# Patient Record
Sex: FEMALE | Race: WHITE | NOT HISPANIC OR LATINO | Employment: FULL TIME | URBAN - METROPOLITAN AREA
[De-identification: names, ages, dates, MRNs, and addresses within clinical notes are randomized per-mention and may not be internally consistent; named-entity substitution may affect disease eponyms.]

---

## 2019-10-30 ENCOUNTER — TRANSCRIBE ORDERS (OUTPATIENT)
Dept: ADMINISTRATIVE | Facility: HOSPITAL | Age: 34
End: 2019-10-30

## 2019-10-30 DIAGNOSIS — N18.30 CHRONIC KIDNEY DISEASE, STAGE III (MODERATE) (HCC): Primary | ICD-10-CM

## 2019-11-07 ENCOUNTER — HOSPITAL ENCOUNTER (OUTPATIENT)
Dept: CT IMAGING | Facility: HOSPITAL | Age: 34
Discharge: HOME | End: 2019-11-08
Attending: INTERNAL MEDICINE | Admitting: INTERNAL MEDICINE

## 2019-11-07 DIAGNOSIS — N18.30 CHRONIC KIDNEY DISEASE, STAGE III (MODERATE) (HCC): ICD-10-CM

## 2019-11-07 LAB
ABO GROUP BLD: NORMAL
ALBUMIN SERPL-MCNC: 4.1 G/DL (ref 3.5–5.2)
ANION GAP SERPL CALCULATED.3IONS-SCNC: 10 MMOL/L (ref 5–15)
APTT PPP: 27.6 SECONDS (ref 24–37)
B-HCG UR QL: NEGATIVE
BACTERIA UR QL AUTO: NORMAL /HPF
BILIRUB UR QL STRIP: NEGATIVE
BLD GP AB SCN SERPL QL: NEGATIVE
BUN BLD-MCNC: 16 MG/DL (ref 6–20)
BUN/CREAT SERPL: 11.5 (ref 7–25)
CALCIUM SPEC-SCNC: 9.5 MG/DL (ref 8.6–10.5)
CHLORIDE SERPL-SCNC: 105 MMOL/L (ref 98–107)
CLARITY UR: CLEAR
CLOSURE TME COLL+ADP BLD: 98 SECONDS (ref 54–123)
CLOSURE TME COLL+EPINEP BLD: 146 SECONDS (ref 72–192)
CO2 SERPL-SCNC: 24 MMOL/L (ref 22–29)
COLOR UR: YELLOW
CREAT BLD-MCNC: 1.39 MG/DL (ref 0.57–1)
DEPRECATED RDW RBC AUTO: 39.8 FL (ref 37–54)
ERYTHROCYTE [DISTWIDTH] IN BLOOD BY AUTOMATED COUNT: 13 % (ref 12.3–15.4)
GFR SERPL CREATININE-BSD FRML MDRD: 43 ML/MIN/1.73
GLUCOSE BLD-MCNC: 90 MG/DL (ref 65–99)
GLUCOSE UR STRIP-MCNC: NEGATIVE MG/DL
HCT VFR BLD AUTO: 37.3 % (ref 34–46.6)
HCT VFR BLD AUTO: 42.6 % (ref 34–46.6)
HGB BLD-MCNC: 11.8 G/DL (ref 12–15.9)
HGB BLD-MCNC: 13.7 G/DL (ref 12–15.9)
HGB UR QL STRIP.AUTO: ABNORMAL
HYALINE CASTS UR QL AUTO: NORMAL /LPF
INR PPP: 1.03 (ref 0.85–1.16)
KETONES UR QL STRIP: NEGATIVE
LEUKOCYTE ESTERASE UR QL STRIP.AUTO: NEGATIVE
MCH RBC QN AUTO: 27.4 PG (ref 26.6–33)
MCHC RBC AUTO-ENTMCNC: 32.2 G/DL (ref 31.5–35.7)
MCV RBC AUTO: 85.2 FL (ref 79–97)
NITRITE UR QL STRIP: NEGATIVE
PH UR STRIP.AUTO: 7 [PH] (ref 5–8)
PHOSPHATE SERPL-MCNC: 3.3 MG/DL (ref 2.5–4.5)
PLATELET # BLD AUTO: 215 10*3/MM3 (ref 140–450)
PMV BLD AUTO: 12.7 FL (ref 6–12)
POTASSIUM BLD-SCNC: 4.3 MMOL/L (ref 3.5–5.2)
PROT UR QL STRIP: ABNORMAL
PROTHROMBIN TIME: 13 SECONDS (ref 11.2–14.3)
RBC # BLD AUTO: 5 10*6/MM3 (ref 3.77–5.28)
RBC # UR: NORMAL /HPF
REF LAB TEST METHOD: NORMAL
RH BLD: POSITIVE
SODIUM BLD-SCNC: 139 MMOL/L (ref 136–145)
SP GR UR STRIP: 1.01 (ref 1–1.03)
SQUAMOUS #/AREA URNS HPF: NORMAL /HPF
T&S EXPIRATION DATE: NORMAL
UROBILINOGEN UR QL STRIP: ABNORMAL
WBC NRBC COR # BLD: 4.38 10*3/MM3 (ref 3.4–10.8)
WBC UR QL AUTO: NORMAL /HPF

## 2019-11-07 PROCEDURE — 88348 ELECTRON MICROSCOPY DX: CPT | Performed by: INTERNAL MEDICINE

## 2019-11-07 PROCEDURE — 80069 RENAL FUNCTION PANEL: CPT | Performed by: INTERNAL MEDICINE

## 2019-11-07 PROCEDURE — 86900 BLOOD TYPING SEROLOGIC ABO: CPT | Performed by: INTERNAL MEDICINE

## 2019-11-07 PROCEDURE — 85018 HEMOGLOBIN: CPT | Performed by: INTERNAL MEDICINE

## 2019-11-07 PROCEDURE — 86850 RBC ANTIBODY SCREEN: CPT | Performed by: INTERNAL MEDICINE

## 2019-11-07 PROCEDURE — 85730 THROMBOPLASTIN TIME PARTIAL: CPT | Performed by: INTERNAL MEDICINE

## 2019-11-07 PROCEDURE — 25010000002 ATROPINE PER 0.01 MG: Performed by: INTERNAL MEDICINE

## 2019-11-07 PROCEDURE — 85014 HEMATOCRIT: CPT | Performed by: INTERNAL MEDICINE

## 2019-11-07 PROCEDURE — 25010000003 LIDOCAINE 1 % SOLUTION: Performed by: INTERNAL MEDICINE

## 2019-11-07 PROCEDURE — 88346 IMFLUOR 1ST 1ANTB STAIN PX: CPT | Performed by: INTERNAL MEDICINE

## 2019-11-07 PROCEDURE — 88350 IMFLUOR EA ADDL 1ANTB STN PX: CPT | Performed by: INTERNAL MEDICINE

## 2019-11-07 PROCEDURE — 86901 BLOOD TYPING SEROLOGIC RH(D): CPT | Performed by: INTERNAL MEDICINE

## 2019-11-07 PROCEDURE — 85027 COMPLETE CBC AUTOMATED: CPT | Performed by: INTERNAL MEDICINE

## 2019-11-07 PROCEDURE — 88313 SPECIAL STAINS GROUP 2: CPT | Performed by: INTERNAL MEDICINE

## 2019-11-07 PROCEDURE — G0378 HOSPITAL OBSERVATION PER HR: HCPCS

## 2019-11-07 PROCEDURE — 88305 TISSUE EXAM BY PATHOLOGIST: CPT | Performed by: INTERNAL MEDICINE

## 2019-11-07 PROCEDURE — 81001 URINALYSIS AUTO W/SCOPE: CPT | Performed by: INTERNAL MEDICINE

## 2019-11-07 PROCEDURE — 77012 CT SCAN FOR NEEDLE BIOPSY: CPT

## 2019-11-07 PROCEDURE — 25010000002 PROMETHAZINE PER 50 MG: Performed by: INTERNAL MEDICINE

## 2019-11-07 PROCEDURE — 85576 BLOOD PLATELET AGGREGATION: CPT | Performed by: INTERNAL MEDICINE

## 2019-11-07 PROCEDURE — 85610 PROTHROMBIN TIME: CPT | Performed by: INTERNAL MEDICINE

## 2019-11-07 PROCEDURE — 81025 URINE PREGNANCY TEST: CPT | Performed by: INTERNAL MEDICINE

## 2019-11-07 RX ORDER — LIDOCAINE HYDROCHLORIDE 10 MG/ML
20 INJECTION, SOLUTION INFILTRATION; PERINEURAL ONCE
Status: COMPLETED | OUTPATIENT
Start: 2019-11-07 | End: 2019-11-07

## 2019-11-07 RX ORDER — LOSARTAN POTASSIUM AND HYDROCHLOROTHIAZIDE 12.5; 5 MG/1; MG/1
1 TABLET ORAL DAILY
COMMUNITY
End: 2020-01-08

## 2019-11-07 RX ORDER — MEPERIDINE HYDROCHLORIDE 50 MG/ML
25 INJECTION INTRAMUSCULAR; INTRAVENOUS; SUBCUTANEOUS
Status: COMPLETED | OUTPATIENT
Start: 2019-11-07 | End: 2019-11-07

## 2019-11-07 RX ORDER — LORAZEPAM 2 MG/ML
0.5 INJECTION INTRAMUSCULAR ONCE AS NEEDED
Status: DISCONTINUED | OUTPATIENT
Start: 2019-11-07 | End: 2019-11-08 | Stop reason: HOSPADM

## 2019-11-07 RX ORDER — PROMETHAZINE HYDROCHLORIDE 25 MG/ML
12.5 INJECTION, SOLUTION INTRAMUSCULAR; INTRAVENOUS
Status: COMPLETED | OUTPATIENT
Start: 2019-11-07 | End: 2019-11-07

## 2019-11-07 RX ORDER — ATROPINE SULFATE 0.4 MG/ML
0.4 AMPUL (ML) INJECTION
Status: COMPLETED | OUTPATIENT
Start: 2019-11-07 | End: 2019-11-07

## 2019-11-07 RX ADMIN — LIDOCAINE HYDROCHLORIDE 20 ML: 10 INJECTION, SOLUTION INFILTRATION; PERINEURAL at 13:55

## 2019-11-07 RX ADMIN — ATROPINE SULFATE 0.4 MG: 0.4 INJECTION, SOLUTION INTRAMUSCULAR; INTRAVENOUS; SUBCUTANEOUS at 13:09

## 2019-11-07 RX ADMIN — PROMETHAZINE HYDROCHLORIDE 12.5 MG: 25 INJECTION INTRAMUSCULAR; INTRAVENOUS at 13:09

## 2019-11-07 RX ADMIN — MEPERIDINE HYDROCHLORIDE 25 MG: 50 INJECTION INTRAMUSCULAR; INTRAVENOUS; SUBCUTANEOUS at 13:06

## 2019-11-08 VITALS
RESPIRATION RATE: 16 BRPM | HEART RATE: 64 BPM | WEIGHT: 169.09 LBS | SYSTOLIC BLOOD PRESSURE: 117 MMHG | TEMPERATURE: 98 F | DIASTOLIC BLOOD PRESSURE: 67 MMHG | OXYGEN SATURATION: 94 % | BODY MASS INDEX: 28.17 KG/M2 | HEIGHT: 65 IN

## 2019-11-08 LAB
DEPRECATED RDW RBC AUTO: 40.5 FL (ref 37–54)
ERYTHROCYTE [DISTWIDTH] IN BLOOD BY AUTOMATED COUNT: 13 % (ref 12.3–15.4)
HCT VFR BLD AUTO: 37.2 % (ref 34–46.6)
HGB BLD-MCNC: 11.9 G/DL (ref 12–15.9)
MCH RBC QN AUTO: 27.4 PG (ref 26.6–33)
MCHC RBC AUTO-ENTMCNC: 32 G/DL (ref 31.5–35.7)
MCV RBC AUTO: 85.7 FL (ref 79–97)
PLATELET # BLD AUTO: 191 10*3/MM3 (ref 140–450)
PMV BLD AUTO: 12.4 FL (ref 6–12)
RBC # BLD AUTO: 4.34 10*6/MM3 (ref 3.77–5.28)
WBC NRBC COR # BLD: 5.67 10*3/MM3 (ref 3.4–10.8)

## 2019-11-08 PROCEDURE — 85027 COMPLETE CBC AUTOMATED: CPT | Performed by: INTERNAL MEDICINE

## 2019-11-08 PROCEDURE — G0378 HOSPITAL OBSERVATION PER HR: HCPCS

## 2019-11-26 ENCOUNTER — TRANSCRIBE ORDERS (OUTPATIENT)
Dept: WOMENS IMAGING | Facility: HOSPITAL | Age: 34
End: 2019-11-26

## 2019-11-26 DIAGNOSIS — N28.9 NEPHROPATHY: Primary | ICD-10-CM

## 2019-11-27 LAB
LAB AP CASE REPORT: NORMAL
PATH REPORT.FINAL DX SPEC: NORMAL

## 2020-01-08 ENCOUNTER — OFFICE VISIT (OUTPATIENT)
Dept: OBSTETRICS AND GYNECOLOGY | Facility: HOSPITAL | Age: 35
End: 2020-01-08

## 2020-01-08 ENCOUNTER — APPOINTMENT (OUTPATIENT)
Dept: WOMENS IMAGING | Facility: HOSPITAL | Age: 35
End: 2020-01-08

## 2020-01-08 VITALS
DIASTOLIC BLOOD PRESSURE: 102 MMHG | BODY MASS INDEX: 26.78 KG/M2 | WEIGHT: 166.6 LBS | SYSTOLIC BLOOD PRESSURE: 131 MMHG | HEART RATE: 65 BPM | HEIGHT: 66 IN

## 2020-01-08 DIAGNOSIS — N02.8 IGA NEPHROPATHY: ICD-10-CM

## 2020-01-08 DIAGNOSIS — I10 CHRONIC HYPERTENSION: Primary | ICD-10-CM

## 2020-01-08 DIAGNOSIS — N18.30 CHRONIC KIDNEY DISEASE, STAGE III (MODERATE) (HCC): ICD-10-CM

## 2020-01-08 PROCEDURE — 99203 OFFICE O/P NEW LOW 30 MIN: CPT | Performed by: OBSTETRICS & GYNECOLOGY

## 2020-01-08 RX ORDER — NIFEDIPINE 30 MG/1
30 TABLET, EXTENDED RELEASE ORAL DAILY
Qty: 30 TABLET | Refills: 1 | Status: SHIPPED | OUTPATIENT
Start: 2020-01-08 | End: 2021-03-30 | Stop reason: ALTCHOICE

## 2020-01-08 RX ORDER — LOSARTAN POTASSIUM 50 MG/1
25 TABLET ORAL DAILY
COMMUNITY
End: 2021-03-26 | Stop reason: ALTCHOICE

## 2020-01-08 NOTE — PROGRESS NOTES
2020        Woody Graham M.D.  [address] [place] [address]    RE: Delisa Villegas  : 85    Dear Negrito,     Thank you for requesting our service to provide preconceptual counseling to Mrs. Villegas.  As you will recall, she is a 34-year-old white female G0 with a history of chronic hypertension and chronic kidney disease as the result of IgA nephropathy.      On review of her social history, the patient has been  for 1½ years.  The father of her fetus is  and has one child from a previous relationship.  She denies tobacco and ethanol use.  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       Her family history is negative for diabetes, hypertension and preeclampsia.  On further review of her family history, she denies any family members with Down syndrome, mental retardation or neural tube defects. She further denies any family history of genetic disorders including hemophilia, muscular dystrophy, Peggy chorea or cystic fibrosis.      Her past medical history includes hypertension diagnosed at age 18 years of life   treated with medication and stage 3 kidney failure.  She had a renal biopsy in 2019 consistent with IgA nephropathy.  The patient was treated with Losartan 50 mg p.o. q. day but discontinued this medication given her plans for pregnancy.  When her blood pressure increased and she developed headaches, she recently restarted her Losartan therapy.        On examination today, /102 mmHg, heart rate 80 bpm and weight 166 (BMI 26.9).      The following is a summary of my counseling session with Mrs. Villegas given her underlying medical problems including IgA nephropathy and chronic hypertension as well as advanced maternal age:      1. Chronic Kidney Disease.  Chronic kidney disease is a term that encompasses all degrees of decreased renal function.  The Kidney Disease Outcomes  Quality initiative (KDOQI) defines CKD as either kidney damage or decreased glomerular filtration rate (GFR) of less than 60 mL/min/1.73 m2 for 3 or more months.  Whatever the underlying etiology, once the loss of nephrons and reduction of functional renal mass reaches a certain point the remaining nephrons begin a process of reversible sclerosis that leads to a progressive decline in the GFR.      The KDOQI classification of the stages of CKD is as follows:    o Stage 1:  Kidney damage with normal or increased GFR (>90 mL/min/1.73 m2)  o Stage 2:  Mild reduction in GFR (60-89 mL/min/1.73 m2)  o Stage 3:  Moderate reduction in GFR (30-59 mL/min/1.73 m2)  o Stage 4:  Severe reduction n GFR (15-29 mL/min/1.73 m2)  o Stage 5:  Kidney failure (GFR <15 mL/min/1.73 m2 or dialysis)    The patient has been diagnosed with IgA nephropathy by renal biopsy.  IgA nephropathy is the most common cause of primary glomerulonephritis in the developed world. It results in a slow progression to end stage renal disease in approximately 50% of affected patients when observed for up to 25 years.  Currently her KDOQI classification is stage 3.  Risk factors for progressive disease include elevated serum creatinine (especially greater than 1.7 mg/dL), hypertension and persistent protein excretion above one gram per day.  Treatment of IgA nephropathy usually includes angiotensin-converting enzyme (ACE) inhibitors or angiotensin II receptor blockers (ARB) for blood pressure control and slowing of the progression of renal disease.  Unfortunately, both of these agents are contraindicated in the 2nd and 3rd trimester of pregnancy.  Statin therapy for lipid lowing has also been reported but again, these agents are currently contraindicated for use in the 2nd and 3rd trimester of pregnancy.  The use of glucocorticoids, immunosuppressive agents and fish oil have been reported but their roles are less clear.      Testing in patients with chronic  kidney disease typically includes a complete blood count (CBC), base metabolic panel and urinalysis, with calculation of renal function.  Normochromic normocytic anemia is commonly seen.  Hyperkalemia or low bicarbonate levels may be present.  A 24-hour urine collection for creatinine clearance and total protein excretion should be each trimester or sooner for a significant change in urine protein on urine dipstick.      Control of hypertension is critical in managing patients with chronic renal disease.  Home blood pressure monitoring is advised for women with underlying hypertension.  Beta-blockers, calcium channel blockers, and hydralazine can be used to treat blood pressure effectively in pregnancy.  Clonidine is occasionally useful in refractory patients.  Angiotensin-converting enzyme (ACE) inhibitors are contraindicated as they may result in reversible fetal renal damage.  Fetal growth should be assessed with serial ultrasonography, because growth restriction is common in women with chronic renal disease.  Antepartum testing should be initiated at 28 to 32 weeks' gestation with ultrasound performed for fetal growth q. 4 weeks after 28 weeks' gestation.  Women that develop severe preeclampsia or those with concern for risk of delivery prior to 28 weeks’ gestation generally receive magnesium sulfate therapy for antiseizure prophylaxis or fetal neuroprotection.  Women with a reduced glomerular filtration rate will have an exaggerated rise in serum magnesium with therapy and may develop magnesium toxicity at the usual doses of administration; therefore, caution in dosing and monitoring of serum magnesium levels is indicated.      2. Chronic Hypertension.  The frequency of chronic hypertension in pregnancy is estimated at 1% to 5%.  It is more common in older obese women and in women of -American descent.  Preexisting hypertension is a recognized risk factor for preeclampsia.  Superimposed preeclampsia  develops in 13-40% of women with chronic hypertension, depending on diagnostic criteria, etiology (essential versus secondary), duration, and the severity of hypertension. A major reason for this wide range in incidence is that the definition of superimposed preeclampsia is used liberally in some studies.  Pregnancies complicated by chronic hypertension are also at an increased risk for abruption placentae with the reported rate in women with mild chronic hypertension ranging from 0.7% to 2.7% and in those with severe hypertension may be as high as 5% to 10%.  Both complications may result in significant maternal, fetal, and  morbidity and mortality.     Women with chronic hypertension who develop superimposed preeclampsia have higher rates of adverse maternal and fetal outcomes, but the independent risks associated with uncomplicated chronic hypertension are less clear. An analysis of 1,807 deliveries in women with chronic hypertension found that uncomplicated chronic hypertension was still associated with greater risk of  delivery (odds ratio [OR], 2.7) and postpartum hemorrhage (OR, 2.2) compared with women without hypertension (Salbador 2004). Other adverse maternal outcomes in women with chronic hypertension include accelerated hypertension with resultant target organ damage (eg, to the heart, brain, and kidneys), although in the absence of preeclampsia, this is extremely uncommon. Women with higher prepregnancy blood pressure or those with secondary hypertension are at greater risk for development of severe hypertension during pregnancy. Chronic hypertension is associated with an increased risk of gestational diabetes (OR, 1.8)).  This may reflect similar risk factors for both conditions (obesity) as well as similar pathogenetic mechanisms (insulin resistance). The risk of placental abruption is increased threefold in women with chronic hypertension, although most of the increased risk is  associated with superimposed preeclampsia (Paul 1998).      In nonpregnant individuals, long-term blood pressure control can lead to significant reductions in the rates of stroke and cardiovascular morbidity and mortality.  Based on the available data, there is no compelling evidence that short-term antihypertensive therapy is beneficial for the mother or the fetus in the setting of low-risk hypertension except for a reduction in the rate of exacerbation of hypertension. Antihypertensive therapy, however, is necessary in women with severe hypertension to reduce the acute risk for stroke, congestive heart failure, and renal failure.  In addition, control of severe hypertension may permit pregnancy prolongation and thereby improve  outcome.  There is no available evidence, however, that control of severe hypertension reduces the rate of either superimposed preeclampsia or abruptio placentae.  (Hypertension in Pregnancy Task Force.  Obstet Gynecol 2013.)      3. Advanced Maternal Age.  The patient is currently 34 years old but should there be any delay in her attempts at conception, she could achieve an age of 35 at the time of delivery.  I discussed the risk of fetal aneuploidy at age 35 years at the time of delivery including a risk for Down syndrome of 1 in 385 and a risk for any chromosomal abnormality as 1 in 192.  Options in genetic screening and genetic testing were discussed with the patient.  I noted genetic testing could include 1st trimester chorionic villous sampling.  This would carry a 1 in 100 procedure-related fetal loss rate.  I also noted the option of midtrimester transabdominal amniocentesis for the determination of fetal chromosomal complement as well as amniotic fluid alpha-fetoprotein.  I quoted a procedure-related fetal loss rate of 1 in 500 with a midtrimester amniocentesis.  I noted a recent option of cell-free fetal DNA for genetic screening.  This screen appears to have a high  sensitivity for detection of Down syndrome but a lower detection for other fetal aneuploidies.  Options in genetic screening would include 1st trimester combined ultrasound and biochemical assessment.  I noted this would detect approximately 85% of fetuses with Down syndrome with a 5% false positive rate.  I would also note the option of midtrimester ultrasound and/or biochemical screening which could provide a detection rate for Down syndrome as high as 80% and for open neural tube defects above 90%.      4. Influenza Vaccination.  The Centers for Disease Control and Prevention's Advisory Committee on Immunization Practices and the American College of Obstetricians and Gynecologists recommend that all adults receive an annual influenza vaccine.  Influenza vaccination is an essential element of preconception, prenatal, and postpartum care because pregnant women are at an increased risk of serious illness due to seasonal and pandemic influenza.  Flu is more likely to cause severe illness in pregnant women than in women who are not pregnant.  Changes in the immune system, heart, and lungs during pregnancy make pregnant women more prone to severe illness from flu, as well as to hospitalizations and even death.  Pregnant women with flu also have a greater chance for serious problems in their fetus including premature labor and delivery.  The risk of premature labor and delivery increases when pregnant women get the flu and there is a greater chance of their babies having birth defects.  Influenza vaccination has not been shown to cause harm to pregnant women or their babies and is safe to be administered in any trimester of pregnancy.      5. Preconceptual Genetic Carrier Screening.  The goal of genetic screening is to provide individuals with meaningful information that they can use to guide pregnancy planning based on their personal values.  Screening for any condition is optional and a patient may decline any or all  carrier screening.  The determination of the appropriate screening approach should be based on the patient's family history and personal values after counseling.  Per the March 2017 ACOG Committee Opinion, all patients who are considering pregnancy or are already pregnant, regardless of screening strategy and ethnicity, should be offered carrier screening for cystic fibrosis and spinal muscular atrophy, as well as a complete blood count and screening for thalassemias and hemoglobinopathies.  Fragile X permutation carrier screening is recommended for women with a family history of fragile X-related disorders or intellectual disability suggestive of fragile X syndrome (Committee Opinion #690). It is important to note, however, that carrier screening will not identify all individuals who are at risk of the screened conditions.      The patient and her  are both  and of  descent.  As such, each carries a 1 in 25 risk for being carriers of the autosomal recessive gene for cystic fibrosis.  I discussed the possibility of cystic fibrosis carrier status determination for Mrs. Villegas.  Should this return positive, I would offer similar testing to her .  If both individuals of this couple were deemed positive as carriers of the autosomal recessive gene for cystic fibrosis, this would place their risk of having an offspring with cystic fibrosis disease as 1 in 4.   The patient will review her options in cystic fibrosis carrier status determination with her  and, if desired, undergo evaluation.      Spinal muscular atrophy (SMA) is a genetic disorder that affects the control of muscle movement.  It is caused by a loss of specialized nerve cells, called motor neurons, in the spinal cord and brainstem.  The loss of motor neurons leads to weakness and wasting of muscles used for activities such as crawling, walking and controlling head movement.  In severe cases of spinal muscular atrophy, the  muscles used for breathing and swallowing are affected.  SMA is the second most common fatal autosomal recessive disorder after cystic fibrosis, with an estimated prevalence of 1 in 10,000 live births and a carrier frequency of 1/40 - 160.      Childhood SMA is subdivided into clinical groups on the basis of age of onset and clinical course.  Types I, II, III and IV spinal muscular atrophy are inherited in an autosomal recessive pattern, which means both copies of the SMN1 gene in each cell have mutations.  Because SMA is the result of a common single deletion event in 95% of the cases, the carrier test is very sensitive (~90% detection rate); however, the molecular testing does not identify all carriers and therefore false-negatives can occur.  Further, approximately 2% of affected individuals with SMA have a de blayne mutation.      I discussed the possibility of SMA carrier status determination for Mrs. Villegas.  Should this return positive, I would offer similar testing to her .  If both individuals of this couple were deemed positive as carriers of the autosomal recessive gene for SMA, this would place their risk of having an offspring with SMA disease as 1 in 4.   The patient will review her options in SMA carrier status determination with her  and, if desired, undergo evaluation.      6. Medication Review.    o Losartan (Cozaar) is a selective angiotensin 2 receptor antagonist that is currently considered risk factor Dm if used in the 2nd or 3rd trimester.  Use of this drug during the 2nd and 3rd trimesters can cause teratogenicity and severe fetal and  toxicity that is identical to that seen with ACE inhibitors.  Fetotoxic effects may include enuria, oligohydramnios, intrauterine growth retardation and prematurity.  Enuria-associated oligohydramnios may produce limb contractures, craniofacial deformation and pulmonary hypoplasia.  Angiotensin-converting enzyme inhibitor exposure in the  1st trimester is associated with an increased risk for congenital heart defects with a risk ratio of 3.72.  Of note, most of the reported defects were atrial septal defect or patency of the ductus arteriosus which would not be conducive to a prenatal diagnosis.  As such, conversion to a different antihypertensive therapy should be considered before the patient proceeds with attempts at conception.      o Nifedipine (Procardia, Adalat) is a dihydropyridine calcium channel-blocking agent that has been used extensively in pregnancy as an antihypertensive agent as well as an agent used for tocolysis of labor.  It has little teratogenic or fetotoxic potential. Nifedipine's mechanism of action is through smooth-muscle relaxation secondary to blockage of the slow calcium channels into the cells. In vivo, there is minimal effect on the cardiac conducting system. Multiple studies in women have demonstrated the effectiveness and safety of nifedipine as an antihypertensive.    7. Folic Acid Supplementation.  I discussed the potential benefit of folic acid supplementation for the reduction of risk of fetal open neural tube defects.  I also noted more recent public health information would suggest folic acid supplementation is associated with a reduction in risk of congenital heart defects, abdominal wall defects and potentially cleft lip and palate.  I noted that this supplementation would need to be started at least three months preconceptually and continued through the 1st trimester of pregnancy.  The patient encouraged on a daily prenatal vitamin for a total supplementation of 1 mg of folic acid a day for its potential benefit.      Thank you again for allowing our service to provide perconceptual counseling to Mrs. Villegas.  I have elected to initiate nifedipine 30 mg XL p.o. q. day as her blood pressures have increased following discontinuation of her Losartan therapy.  If the patient does not tolerate nifedipine therapy  due to headache, then labetalol therapy could be considered.  I would suggest 24-hour urine studies for creatinine clearance and total protein excretion as a baseline as they can be useful in counseling the patient as to her risk for development of preeclampsia and adverse pregnancy outcome.  Further, this baseline can be used to compare changes in renal function in the 3rd trimester if there is suspicion for the development of superimposed preeclampsia.  I would also recommend that a hemoglobin A1C and CMP be obtained as a baseline.  If the patient elects for cystic fibrosis and SMA genetic screening, this could be performed with the same phlebotomy.  The patient should begin daily low-dose aspirin therapy at 12 weeks’ gestation for its potential reduction in the risk for the development of preeclampsia.  I have also discussed the potential benefit of folic acid supplementation for the reduction in risk of potential birth defects, specifically open neural tube defects.  If I can provide any further information concerning my counseling of Mrs. Villegas or be of further assistance in the future, please feel free to contact me.    Yours sincerely,        Marlon Ashraf M.D.  Director, Maternal-Fetal Medicine    [id number]    cc:  Delisa Villegas  [address] [place] [address]    I spent a total time of 30 minutes with this patient of which greater than 50% was face-to-face counseling and coordination of care.  Questions asked by the patient were answered.

## 2020-01-08 NOTE — PROGRESS NOTES
Presents for preconceptual counseling. Sees Dr. Graham for IGA nephropathy. States he had her stop her BP medication after the renal biopsy done 11/7/2019. Reports she contacted his office last week about restarting BP med because her BP was running 140/90 with significant headaches. She has not gotten a response yet so she restarted it herself about 4 days ago. Admits she is extremely anxious.

## 2021-03-25 DIAGNOSIS — Z76.89 ENCOUNTER FOR ASSESSMENT FOR FETAL DEMISE: Primary | ICD-10-CM

## 2021-03-26 ENCOUNTER — OFFICE VISIT (OUTPATIENT)
Dept: OBSTETRICS AND GYNECOLOGY | Facility: CLINIC | Age: 36
End: 2021-03-26

## 2021-03-26 VITALS
SYSTOLIC BLOOD PRESSURE: 130 MMHG | HEIGHT: 66 IN | BODY MASS INDEX: 24.91 KG/M2 | WEIGHT: 155 LBS | DIASTOLIC BLOOD PRESSURE: 86 MMHG

## 2021-03-26 DIAGNOSIS — O02.1 MISSED ABORTION: Primary | ICD-10-CM

## 2021-03-26 PROCEDURE — 99212 OFFICE O/P EST SF 10 MIN: CPT | Performed by: OBSTETRICS & GYNECOLOGY

## 2021-03-26 RX ORDER — LABETALOL 100 MG/1
TABLET, FILM COATED ORAL
COMMUNITY
End: 2021-03-30 | Stop reason: ALTCHOICE

## 2021-03-30 ENCOUNTER — LAB (OUTPATIENT)
Dept: LAB | Facility: HOSPITAL | Age: 36
End: 2021-03-30

## 2021-03-30 ENCOUNTER — APPOINTMENT (OUTPATIENT)
Dept: PREADMISSION TESTING | Facility: HOSPITAL | Age: 36
End: 2021-03-30

## 2021-03-30 ENCOUNTER — OFFICE VISIT (OUTPATIENT)
Dept: OBSTETRICS AND GYNECOLOGY | Facility: CLINIC | Age: 36
End: 2021-03-30

## 2021-03-30 ENCOUNTER — HOSPITAL ENCOUNTER (OUTPATIENT)
Dept: CARDIOLOGY | Facility: HOSPITAL | Age: 36
Discharge: HOME | End: 2021-03-30

## 2021-03-30 VITALS
BODY MASS INDEX: 24.91 KG/M2 | RESPIRATION RATE: 14 BRPM | SYSTOLIC BLOOD PRESSURE: 116 MMHG | HEIGHT: 66 IN | DIASTOLIC BLOOD PRESSURE: 64 MMHG | WEIGHT: 155 LBS

## 2021-03-30 DIAGNOSIS — N18.30 STAGE 3 CHRONIC KIDNEY DISEASE, UNSPECIFIED WHETHER STAGE 3A OR 3B CKD (HCC): ICD-10-CM

## 2021-03-30 DIAGNOSIS — I49.9 IRREGULAR HEART RATE: ICD-10-CM

## 2021-03-30 DIAGNOSIS — O02.1 MISSED ABORTION: Primary | ICD-10-CM

## 2021-03-30 DIAGNOSIS — O02.1 MISSED ABORTION: ICD-10-CM

## 2021-03-30 LAB
ALBUMIN SERPL-MCNC: 3.9 G/DL (ref 3.5–5.2)
ALBUMIN/GLOB SERPL: 1.3 G/DL
ALP SERPL-CCNC: 15 U/L (ref 39–117)
ALT SERPL W P-5'-P-CCNC: 9 U/L (ref 1–33)
ANION GAP SERPL CALCULATED.3IONS-SCNC: 9.2 MMOL/L (ref 5–15)
AST SERPL-CCNC: 11 U/L (ref 1–32)
BILIRUB SERPL-MCNC: 0.5 MG/DL (ref 0–1.2)
BUN SERPL-MCNC: 12 MG/DL (ref 6–20)
BUN/CREAT SERPL: 9 (ref 7–25)
CALCIUM SPEC-SCNC: 9.5 MG/DL (ref 8.6–10.5)
CHLORIDE SERPL-SCNC: 101 MMOL/L (ref 98–107)
CO2 SERPL-SCNC: 22.8 MMOL/L (ref 22–29)
CREAT SERPL-MCNC: 1.33 MG/DL (ref 0.57–1)
DEPRECATED RDW RBC AUTO: 39.1 FL (ref 37–54)
ERYTHROCYTE [DISTWIDTH] IN BLOOD BY AUTOMATED COUNT: 12.8 % (ref 12.3–15.4)
GFR SERPL CREATININE-BSD FRML MDRD: 45 ML/MIN/1.73
GLOBULIN UR ELPH-MCNC: 3 GM/DL
GLUCOSE SERPL-MCNC: 82 MG/DL (ref 65–99)
HCT VFR BLD AUTO: 40.7 % (ref 34–46.6)
HGB BLD-MCNC: 13.8 G/DL (ref 12–15.9)
MCH RBC QN AUTO: 28.8 PG (ref 26.6–33)
MCHC RBC AUTO-ENTMCNC: 33.9 G/DL (ref 31.5–35.7)
MCV RBC AUTO: 85 FL (ref 79–97)
PLATELET # BLD AUTO: 189 10*3/MM3 (ref 140–450)
PMV BLD AUTO: 13.3 FL (ref 6–12)
POTASSIUM SERPL-SCNC: 4.4 MMOL/L (ref 3.5–5.2)
PROT SERPL-MCNC: 6.9 G/DL (ref 6–8.5)
QT INTERVAL: 448 MS
QTC INTERVAL: 432 MS
RBC # BLD AUTO: 4.79 10*6/MM3 (ref 3.77–5.28)
SODIUM SERPL-SCNC: 133 MMOL/L (ref 136–145)
WBC # BLD AUTO: 6.93 10*3/MM3 (ref 3.4–10.8)

## 2021-03-30 PROCEDURE — 93005 ELECTROCARDIOGRAM TRACING: CPT | Performed by: OBSTETRICS & GYNECOLOGY

## 2021-03-30 PROCEDURE — C9803 HOPD COVID-19 SPEC COLLECT: HCPCS

## 2021-03-30 PROCEDURE — 93010 ELECTROCARDIOGRAM REPORT: CPT | Performed by: INTERNAL MEDICINE

## 2021-03-30 PROCEDURE — 85027 COMPLETE CBC AUTOMATED: CPT

## 2021-03-30 PROCEDURE — 99213 OFFICE O/P EST LOW 20 MIN: CPT | Performed by: OBSTETRICS & GYNECOLOGY

## 2021-03-30 PROCEDURE — 80053 COMPREHEN METABOLIC PANEL: CPT

## 2021-03-30 PROCEDURE — U0004 COV-19 TEST NON-CDC HGH THRU: HCPCS

## 2021-03-30 NOTE — PROGRESS NOTES
Subjective   Delisa Gong is a 35 y.o. female is here today for follow-up.    Chief Complaint   Patient presents with   • Follow-up        History of Present Illness  Patient has not missed  after in vitro fertilization.  Ultrasound shows a IUFD at 10 weeks.  Patient is requesting a suction D&C.  She reports irregular heartbeat that occurs weekly.  Patient will have an EKG prior to anesthesia.  Patient has a history of stage III renal failure so CMP will be drawn prior to the procedure.  Consult from anesthesia at [facility] was obtained and they felt patient was appropriate for outpatient surgery procedure  The following portions of the patient's history were reviewed and updated as appropriate: allergies, current medications, past family history, past medical history, past social history, past surgical history and problem list.    Review of Systems - History obtained from the patient  General ROS: negative  Psychological ROS: positive for - anxiety and depression  ENT ROS: negative  Allergy and Immunology ROS: negative  Hematological and Lymphatic ROS: negative  Endocrine ROS: negative  Breast ROS: negative for breast lumps  Respiratory ROS: no cough, shortness of breath, or wheezing  Cardiovascular ROS: positive for - irregular heartbeat  Gastrointestinal ROS: no abdominal pain, change in bowel habits, or black or bloody stools  Genito-Urinary ROS: no dysuria, trouble voiding, or hematuria  Musculoskeletal ROS: negative  Neurological ROS: no TIA or stroke symptoms  Dermatological ROS: negative     Objective   General:  well developed; well nourished  no acute distress   Skin:  No suspicious lesions seen   Thyroid: not examined   Breasts:  Examined in supine position  Symmetric without masses or skin dimpling  Nipples normal without inversion, lesions or discharge  There are no palpable axillary nodes   Abdomen: soft, non-tender; no masses  no umbilical or inguinal hernias are  present  no hepato-splenomegaly   Heart: regular rate and rhythm, S1, S2 normal, no murmur, click, rub or gallop   Lungs: clear to auscultation   Pelvis: Clinical staff was present for exam  External genitalia:  normal appearance of the external genitalia including Bartholin's and [place]'s glands.  :  urethral meatus normal;  Vaginal:  normal pink mucosa without prolapse or lesions.  Cervix:  normal appearance.  Uterus:  normal size, shape and consistency. anteverted; Difficult to palpate but probably 10 weeks size  Adnexa:  normal bimanual exam of the adnexa.  Rectal:  digital rectal exam not performed; anus visually normal appearing.         Assessment/Plan   Diagnoses and all orders for this visit:    1. Missed  (Primary)  -     External Facility Surgical/Procedural Request; Future  -     CBC (No Diff); Future  -     Comprehensive Metabolic Panel; Future    2. Irregular heart rate  -     ECG 12 Lead; Future    3. Stage 3 chronic kidney disease, unspecified whether stage 3a or 3b CKD (CMS/HCC)  -     Comprehensive Metabolic Panel; Future      Schedule suction D&C for missed [facility] at [facility]    Chung Valenzuela MD

## 2021-03-31 ENCOUNTER — TELEPHONE (OUTPATIENT)
Dept: OBSTETRICS AND GYNECOLOGY | Facility: CLINIC | Age: 36
End: 2021-03-31

## 2021-03-31 LAB — SARS-COV-2 RNA NOSE QL NAA+PROBE: NOT DETECTED

## 2021-03-31 RX ORDER — MISOPROSTOL 200 UG/1
TABLET ORAL
Qty: 2 TABLET | Refills: 0 | Status: SHIPPED | OUTPATIENT
Start: 2021-03-31

## 2021-03-31 NOTE — TELEPHONE ENCOUNTER
Patient was called and informed that Cytotec 400 mcg had been called to her pharmacy.  Patient should insert the 400 mcg under her tongue before 10:00 on 4/2/2021, 3 hours prior to her suction D&C.    Chung Valenzuela MD

## 2021-04-02 ENCOUNTER — OUTSIDE FACILITY SERVICE (OUTPATIENT)
Dept: OBSTETRICS AND GYNECOLOGY | Facility: CLINIC | Age: 36
End: 2021-04-02

## 2021-04-02 PROCEDURE — 88305 TISSUE EXAM BY PATHOLOGIST: CPT | Performed by: OBSTETRICS & GYNECOLOGY

## 2021-04-02 PROCEDURE — 59812 TREATMENT OF MISCARRIAGE: CPT | Performed by: OBSTETRICS & GYNECOLOGY

## 2021-04-02 RX ORDER — OXYCODONE HYDROCHLORIDE AND ACETAMINOPHEN 5; 325 MG/1; MG/1
1 TABLET ORAL EVERY 4 HOURS PRN
Qty: 15 TABLET | Refills: 0 | Status: SHIPPED | OUTPATIENT
Start: 2021-04-02

## 2021-04-03 ENCOUNTER — LAB REQUISITION (OUTPATIENT)
Dept: LAB | Facility: HOSPITAL | Age: 36
End: 2021-04-03

## 2021-04-03 DIAGNOSIS — O02.1 MISSED ABORTION: ICD-10-CM

## 2021-04-06 LAB
CYTO UR: NORMAL
LAB AP CASE REPORT: NORMAL
LAB AP CLINICAL INFORMATION: NORMAL
PATH REPORT.FINAL DX SPEC: NORMAL
PATH REPORT.GROSS SPEC: NORMAL

## 2021-04-16 ENCOUNTER — OFFICE VISIT (OUTPATIENT)
Dept: OBSTETRICS AND GYNECOLOGY | Facility: CLINIC | Age: 36
End: 2021-04-16

## 2021-04-16 VITALS
DIASTOLIC BLOOD PRESSURE: 90 MMHG | SYSTOLIC BLOOD PRESSURE: 122 MMHG | WEIGHT: 156.8 LBS | BODY MASS INDEX: 25.2 KG/M2 | RESPIRATION RATE: 14 BRPM | HEIGHT: 66 IN

## 2021-04-16 DIAGNOSIS — Z98.890 POSTOPERATIVE STATE: Primary | ICD-10-CM

## 2021-04-16 PROCEDURE — 99024 POSTOP FOLLOW-UP VISIT: CPT | Performed by: OBSTETRICS & GYNECOLOGY

## 2021-04-16 NOTE — PROGRESS NOTES
Subjective   Delisa Villegas is a 35 y.o. female is here today for follow-up.    Chief Complaint   Patient presents with   • Post-op     s/p suction D&C, no complaints        History of Present Illness  Patient is now about 2 weeks post suction D&C for missed  after in vitro fertilization.  The PMS2  occurred at 10 weeks.  The patient underwent a suction D&C and has done extremely well.  Her path report was reviewed.  Patient will try to become pregnant again.  She will return to endocrinology for assistance if needed.  The following portions of the patient's history were reviewed and updated as appropriate: allergies, current medications, past family history, past medical history, past social history, past surgical history and problem list.    Review of Systems - History obtained from the patient  General ROS: negative  Psychological ROS: negative  ENT ROS: negative  Allergy and Immunology ROS: negative  Hematological and Lymphatic ROS: negative  Endocrine ROS: negative  Breast ROS: negative for breast lumps  Respiratory ROS: no cough, shortness of breath, or wheezing  Cardiovascular ROS: no chest pain or dyspnea on exertion  Gastrointestinal ROS: no abdominal pain, change in bowel habits, or black or bloody stools  Genito-Urinary ROS: no dysuria, trouble voiding, or hematuria  Musculoskeletal ROS: negative  Neurological ROS: no TIA or stroke symptoms  Dermatological ROS: negative     Objective   General:  well developed; well nourished  no acute distress   Skin:  Not performed.   Thyroid: not examined   Breasts:  Not performed.   Abdomen: soft, non-tender; no masses  no umbilical or inguinal hernias are present  no hepato-splenomegaly   Heart: regular rate and rhythm, S1, S2 normal, no murmur, click, rub or gallop   Lungs: clear to auscultation   Pelvis: Not performed.         Assessment/Plan   Diagnoses and all orders for this visit:    1. Postoperative state (Primary)      Return as  needed    Chung Valenzuela MD

## 2023-04-14 NOTE — PROGRESS NOTES
Patient has a rather complicated history.  She underwent in vitro fertilization by Dr. Long.  She was seen last week and underwent a transvaginal ultrasound for new OB work-up and was noted to have a intrauterine fetal demise at 10 weeks.  Patient presents here today for another ultrasound to confirm this diagnosis.  Patient is undecided if she will wait for spontaneous miscarriage or have a suction D&C.  She will return next week to think over her decision and presents for new GYN and physical exam.  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  Patient is followed by nephrology in [place].    Impression    Missed     Stage III renal failure    Plan    Return within 1 week    Chung Valenzuela MD    
Statement Selected